# Patient Record
Sex: FEMALE | Race: ASIAN | NOT HISPANIC OR LATINO | Employment: STUDENT | URBAN - METROPOLITAN AREA
[De-identification: names, ages, dates, MRNs, and addresses within clinical notes are randomized per-mention and may not be internally consistent; named-entity substitution may affect disease eponyms.]

---
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
## 2023-05-31 ENCOUNTER — TELEPHONE (OUTPATIENT)
Dept: CARDIOLOGY | Facility: CLINIC | Age: 18
End: 2023-05-31
Payer: MEDICAID

## 2023-05-31 DIAGNOSIS — Q24.2 COR TRIATRIATUM SINISTER: Primary | ICD-10-CM

## 2023-05-31 NOTE — TELEPHONE ENCOUNTER
Appt scheduled for July 26 start time 10 AM. Mom verbalized understanding all information provided. Appointment slips mailed to confirmed address on file.   ----- Message from Juana Morales sent at 5/31/2023  8:42 AM CDT -----  Mom called to schedule patient echo EKG and Holter in two years for [facility]            Mom [phone]            Thank you  Scheduling

## 2023-07-26 ENCOUNTER — OFFICE VISIT (OUTPATIENT)
Dept: CARDIOLOGY | Facility: CLINIC | Age: 18
End: 2023-07-26
Payer: MEDICAID

## 2023-07-26 ENCOUNTER — TELEPHONE (OUTPATIENT)
Dept: CARDIOLOGY | Facility: CLINIC | Age: 18
End: 2023-07-26
Payer: MEDICAID

## 2023-07-26 ENCOUNTER — HOSPITAL ENCOUNTER (OUTPATIENT)
Dept: CARDIOLOGY | Facility: HOSPITAL | Age: 18
Discharge: HOME | End: 2023-07-26
Attending: PEDIATRICS
Payer: MEDICAID

## 2023-07-26 ENCOUNTER — HOSPITAL ENCOUNTER (OUTPATIENT)
Dept: CARDIOLOGY | Facility: CLINIC | Age: 18
Discharge: HOME | End: 2023-07-26
Payer: MEDICAID

## 2023-07-26 VITALS
WEIGHT: 67.88 LBS | OXYGEN SATURATION: 98 % | HEIGHT: 55 IN | HEART RATE: 71 BPM | BODY MASS INDEX: 15.71 KG/M2 | DIASTOLIC BLOOD PRESSURE: 68 MMHG | SYSTOLIC BLOOD PRESSURE: 111 MMHG

## 2023-07-26 VITALS — BODY MASS INDEX: 15.16 KG/M2 | HEIGHT: 55 IN | WEIGHT: 65.5 LBS

## 2023-07-26 DIAGNOSIS — Q24.9 ADULT CONGENITAL HEART DISEASE: ICD-10-CM

## 2023-07-26 DIAGNOSIS — Q24.2 COR TRIATRIATUM SINISTER: ICD-10-CM

## 2023-07-26 DIAGNOSIS — Q24.2 COR TRIATRIATUM SINISTER: Primary | ICD-10-CM

## 2023-07-26 LAB
ASCENDING AORTA: 1.7 CM
AV INDEX (PROSTH): 0.8
AV MEAN GRADIENT: 2 MMHG
AV PEAK GRADIENT: 4 MMHG
AV VALVE AREA: 1.58 CM2
AV VELOCITY RATIO: 0.81
BSA FOR ECHO PROCEDURE: 1.03 M2
CV ECHO LV RWT: 0.44 CM
DOP CALC AO PEAK VEL: 1.02 M/S
DOP CALC AO VTI: 20.49 CM
DOP CALC LVOT AREA: 2 CM2
DOP CALC LVOT DIAMETER: 1.59 CM
DOP CALC LVOT PEAK VEL: 0.83 M/S
DOP CALC LVOT STROKE VOLUME: 32.43 CM3
DOP CALC RVOT PEAK VEL: 0.55 M/S
DOP CALC RVOT VTI: 12.48 CM
DOP CALCLVOT PEAK VEL VTI: 16.34 CM
E WAVE DECELERATION TIME: 135.4 MSEC
E/A RATIO: 1.55
E/E' RATIO: 9.45 M/S
ECHO LV POSTERIOR WALL: 0.73 CM (ref 0.6–1.1)
EJECTION FRACTION: 50 %
FRACTIONAL SHORTENING: 29 % (ref 28–44)
INTERVENTRICULAR SEPTUM: 0.75 CM (ref 0.6–1.1)
LA MAJOR: 3.28 CM
LA MINOR: 3.31 CM
LA WIDTH: 2.55 CM
LEFT ATRIUM SIZE: 2.87 CM
LEFT ATRIUM VOLUME INDEX MOD: 18 ML/M2
LEFT ATRIUM VOLUME INDEX: 19.5 ML/M2
LEFT ATRIUM VOLUME MOD: 18.86 CM3
LEFT ATRIUM VOLUME: 20.5 CM3
LEFT INTERNAL DIMENSION IN SYSTOLE: 2.37 CM (ref 2.1–4)
LEFT VENTRICLE DIASTOLIC VOLUME INDEX: 42.77 ML/M2
LEFT VENTRICLE DIASTOLIC VOLUME: 44.91 ML
LEFT VENTRICLE MASS INDEX: 59 G/M2
LEFT VENTRICLE SYSTOLIC VOLUME INDEX: 18.5 ML/M2
LEFT VENTRICLE SYSTOLIC VOLUME: 19.47 ML
LEFT VENTRICULAR INTERNAL DIMENSION IN DIASTOLE: 3.32 CM (ref 3.5–6)
LEFT VENTRICULAR MASS: 62.17 G
LV LATERAL E/E' RATIO: 9.45 M/S
LV SEPTAL E/E' RATIO: 9.45 M/S
MV PEAK A VEL: 0.67 M/S
MV PEAK E VEL: 1.04 M/S
MV STENOSIS PRESSURE HALF TIME: 39.27 MS
MV VALVE AREA P 1/2 METHOD: 5.6 CM2
PISA TR MAX VEL: 2.13 M/S
PULM VEIN S/D RATIO: 1.02
PV MEAN GRADIENT: 0.64 MMHG
PV PEAK D VEL: 0.47 M/S
PV PEAK S VEL: 0.48 M/S
PV PEAK VELOCITY: 0.9 CM/S
RA MAJOR: 3.24 CM
RA WIDTH: 2.75 CM
RIGHT VENTRICULAR END-DIASTOLIC DIMENSION: 2.62 CM
SINUS: 2.27 CM
STJ: 1.67 CM
TDI LATERAL: 0.11 M/S
TDI SEPTAL: 0.11 M/S
TDI: 0.11 M/S
TR MAX PG: 18 MMHG

## 2023-07-26 PROCEDURE — 3078F PR MOST RECENT DIASTOLIC BLOOD PRESSURE < 80 MM HG: ICD-10-PCS | Mod: CPTII,,, | Performed by: PEDIATRICS

## 2023-07-26 PROCEDURE — 93325 DOPPLER ECHO COLOR FLOW MAPG: CPT | Mod: 26,,, | Performed by: PEDIATRICS

## 2023-07-26 PROCEDURE — 3074F PR MOST RECENT SYSTOLIC BLOOD PRESSURE < 130 MM HG: ICD-10-PCS | Mod: CPTII,,, | Performed by: PEDIATRICS

## 2023-07-26 PROCEDURE — 93303 ECHO TRANSTHORACIC: CPT | Mod: 26,,, | Performed by: PEDIATRICS

## 2023-07-26 PROCEDURE — 93303 ECHO (CUPID ONLY): ICD-10-PCS | Mod: 26,,, | Performed by: PEDIATRICS

## 2023-07-26 PROCEDURE — 93320 DOPPLER ECHO COMPLETE: CPT | Mod: 26,,, | Performed by: PEDIATRICS

## 2023-07-26 PROCEDURE — 1159F PR MEDICATION LIST DOCUMENTED IN MEDICAL RECORD: ICD-10-PCS | Mod: CPTII,,, | Performed by: PEDIATRICS

## 2023-07-26 PROCEDURE — 3074F SYST BP LT 130 MM HG: CPT | Mod: CPTII,,, | Performed by: PEDIATRICS

## 2023-07-26 PROCEDURE — 3008F PR BODY MASS INDEX (BMI) DOCUMENTED: ICD-10-PCS | Mod: CPTII,,, | Performed by: PEDIATRICS

## 2023-07-26 PROCEDURE — 93325 ECHO (CUPID ONLY): ICD-10-PCS | Mod: 26,,, | Performed by: PEDIATRICS

## 2023-07-26 PROCEDURE — 93325 DOPPLER ECHO COLOR FLOW MAPG: CPT

## 2023-07-26 PROCEDURE — 93010 EKG 12-LEAD: ICD-10-PCS | Mod: S$PBB,,, | Performed by: INTERNAL MEDICINE

## 2023-07-26 PROCEDURE — 1159F MED LIST DOCD IN RCRD: CPT | Mod: CPTII,,, | Performed by: PEDIATRICS

## 2023-07-26 PROCEDURE — 99213 OFFICE O/P EST LOW 20 MIN: CPT | Mod: PBBFAC,25 | Performed by: PEDIATRICS

## 2023-07-26 PROCEDURE — 93010 ELECTROCARDIOGRAM REPORT: CPT | Mod: S$PBB,,, | Performed by: INTERNAL MEDICINE

## 2023-07-26 PROCEDURE — 99999 PR PBB SHADOW E&M-EST. PATIENT-LVL III: ICD-10-PCS | Mod: PBBFAC,,, | Performed by: PEDIATRICS

## 2023-07-26 PROCEDURE — 3078F DIAST BP <80 MM HG: CPT | Mod: CPTII,,, | Performed by: PEDIATRICS

## 2023-07-26 PROCEDURE — 93320 ECHO (CUPID ONLY): ICD-10-PCS | Mod: 26,,, | Performed by: PEDIATRICS

## 2023-07-26 PROCEDURE — 3008F BODY MASS INDEX DOCD: CPT | Mod: CPTII,,, | Performed by: PEDIATRICS

## 2023-07-26 PROCEDURE — 99999 PR PBB SHADOW E&M-EST. PATIENT-LVL III: CPT | Mod: PBBFAC,,, | Performed by: PEDIATRICS

## 2023-07-26 PROCEDURE — 99214 OFFICE O/P EST MOD 30 MIN: CPT | Mod: 25,S$PBB,, | Performed by: PEDIATRICS

## 2023-07-26 PROCEDURE — 93005 ELECTROCARDIOGRAM TRACING: CPT | Mod: PBBFAC | Performed by: INTERNAL MEDICINE

## 2023-07-26 PROCEDURE — 99214 PR OFFICE/OUTPT VISIT, EST, LEVL IV, 30-39 MIN: ICD-10-PCS | Mod: 25,S$PBB,, | Performed by: PEDIATRICS

## 2023-07-26 NOTE — PROGRESS NOTES
2023    re:Dixie Rogers  :2005    Nikky Huffman MD  [address]  [place] [place] [place] [address]    [facility]     Dear Dr. Huffman:    Dixie Rogers is a 18 y.o. female seen in my [facility] clinic today for evaluation of congenital heart disease.  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  There is no need for endocarditis prophylaxis or activity restriction.   Follow up in 3 years with echo and ekg.  Can likely space clinic visits out further at that time.    Discussion:  She has a nonobstructive cor triatriatum on the left side.  It is highly unlikely this would progress to require intervention.  She should be treated as completely normal from a cardiac standpoint.  My plan is as noted above.    History of present illness:  This is her 1st time being seen in Adult Cong[facility] Disease Clinic.  She was followed in the past by Dr. Malik.  She is asymptomatic from a cardiovascular standpoint with no concerns about chest pain, palpitations, syncope, near syncope, cyanosis, or edema.  She is very active.  She has significant developmental delay, and the history is provided by the mother.    Past Medical History:   Diagnosis Date    Cleft hard palate     Cor triatriatum sinister     nonobstructive    Otitis media     PDA (patent ductus arteriosus)     Maico Noble's syndrome     Sensorineural hearing loss, unspecified     Strabismus     Vision abnormalities     Vocal cord paralysis      Past Surgical History:   Procedure Laterality Date    cleft palate      DIRECT LARYNGOBRONCHOSCOPY  13    w/ Excision of Tracheocutaneous Fustula Dr. Barrett    epibleharon  2013    Probing and balloon dilation NLD ou    GASTROSTOMY TUBE PLACEMENT      Removed;  finished using it when she was 9-10 yo    MANDIBLE SURGERY  2012    PATENT DUCTUS ARTERIOUS LIGATION  2003    Probing and balloon dilation  07/24/2013    STRABISMUS SURGERY Right     anterior sagittalization of IO  OD    tracheotomy      Trach removed 12/3/2012    TYMPANOSTOMY TUBE PLACEMENT       Family History   Problem Relation Age of Onset    No Known Problems Mother     No Known Problems Father     No Known Problems Sister     Aneurysm Maternal Grandmother     Hypertension Maternal Grandfather     Congenital heart disease Neg Hx     Pacemaker/defibrilator Neg Hx     Early death Neg Hx     Arrhythmia Neg Hx      Social History     Socioeconomic History    Marital status: Single   Tobacco Use    Smoking status: Passive Smoke Exposure - Never Smoker    Smokeless tobacco: Never    Tobacco comments:     Dad smokes   Substance and Sexual Activity    Alcohol use: No    Drug use: No    Sexual activity: Never   Social History Narrative    Lives at home with mom, dad, and sister.No pets at home.  Attends [email or web address].  In special education classes.  Dad smokes.     Current Outpatient Medications on File Prior to Visit   Medication Sig Dispense Refill    cetirizine (ZYRTEC) 1 mg/mL syrup Take 5 mg by mouth daily as needed.      ciprofloxacin-dexamethasone 0.3-0.1% (CIPRODEX) 0.3-0.1 % DrpS 4 gtts to the affected ear(s) bid x 10 d (Patient taking differently: daily as needed. 4 gtts to the affected ear(s) bid x 10 d) 7.5 mL 0     No current facility-administered medications on file prior to visit.     Review of patient's allergies indicates:  No Known Allergies     The review of systems is as noted above. It is otherwise negative for other symptoms related to the general, neurological, psychiatric, endocrine, gastrointestinal, genitourinary, respiratory, dermatologic, musculoskeletal, hematologic, and immunologic systems.    /68 (BP Location: Left arm, Patient Position: Sitting, BP Method: Small  "(Automatic))   Pulse 71   Ht 4' 2.8" (1.29 m)   Wt 30.8 kg (67 lb 14.4 oz)   SpO2 98%   BMI 18.50 kg/m²     Wt Readings from Last 3 Encounters:   07/26/23 30.8 kg (67 lb 14.4 oz)   07/26/23 29.7 kg (65 lb 7.7 oz)   06/15/21 29.7 kg (65 lb 7.6 oz) (<1 %, Z= -7.61)*     * Growth percentiles are based on CDC (Girls, 2-20 Years) data.     Ht Readings from Last 3 Encounters:   07/26/23 4' 2.8" (1.29 m)   07/26/23 4' 2.8" (1.29 m)   06/15/21 4' 2.79" (1.29 m) (<1 %, Z= -5.24)*     * Growth percentiles are based on CDC (Girls, 2-20 Years) data.     Body mass index is 18.5 kg/m².  Facility age limit for growth percentiles is 20 years.  Facility age limit for growth percentiles is 20 years.  Facility age limit for growth percentiles is 20 years.    In general, she is a very small but healthy-appearingdysmorphic female in no apparent distress.  The eyes, nares, and oropharynx are clear.  Eyelids and conjunctiva are normal without drainage or erythema.  Pupils equal and round bilaterally.  The head is normocephalic and atraumatic.  The neck is supple without jugular venous distention or thyroid enlargement.  The lungs are clear to auscultation bilaterally.  There are no scars on the chest wall.  The first and second heart sounds are normal.  There are no murmurs, gallops, rubs, or clicks in the standing position.  The abdominal exam is benign without hepatosplenomegaly, tenderness, or distention, but she does have G-tube.  Pulses are normal in all 4 extremities with brisk capillary refill and no clubbing, cyanosis, or edema.  No rashes are noted.    I personally reviewed the following tests performed today and my interpretation follows:  EKG performed in clinic today is normal.    Results for orders placed during the hospital encounter of 07/26/23    Echo    Interpretation Summary  CONGENITAL CARDIAC HISTORY:  Cor triatriatum sinister-nonobstructive.  H/O Maico Noble, cleft palate, tracheostomy and g-tube.    SEGMENTAL " CARDIAC CONNECTIONS:  Abdominal situs solitus.  Atrial situs solitus.  Atrioventricular alignment is concordant.  D-loop ventricles.  The tricuspid valve appears grossly normal.  The mitral valve appears grossly normal.  There is symmetric development of the left and right ventricles.  The pulmonary valve is structurally normal.  Trivial fusion between coronary cusps of the aortic valve previously reported).  Cardiac position is levocardia.  There is a left aortic arch branching demonstrated.  No interatrial septal defect present.  No ventricular septal defect present.  No patent ductus arteriosis is present.      IMPRESSION:  Difficult acoustic windows-  Qualitative impression of normal right atrial size.  Qualitatively normal right ventricular size and structure with good systolic function.  Normal pulmonary valve structure and function with no stenosis and trivial insufficiency.  Qualitative impression of mildly dilated, confluent pulmonary arteries.  The left atrial volume index is normal in size measuring  20 ml/m2.  Partial septation of the left atrium demonstrated with laminar flow of pulmonary venous return to the mitral annulus by color doppler and peak velocity <1.2 m/sec. by continuous-wave Doppler.  Color Doppler demonstrates trivial to mild mitral insufficiency.  Impression of normal left ventricular size and structure.  Normal septal motion with good movement of the LV free wall, SF = 29% and EF estimated 50 -55% from apical views.  Normal indices of left ventricular diastolic function.  Structure of the aortic valve is not well demonstrated with normal velocity across the valve and no significant insufficiency  (trivial fusion between coronary cusps of the aortic valve previously reported).  Aortic dimensions:  Sinuses of Valsalva = 23 mm.  ST junction               = 16 mm.  Ascending aorta      = 17mm.  The aorta is normal in size with no evidence of coarctation.  No pericardial effusion.      Thank  you for referring this patient to our clinic.  Please call with any questions.    Sincerely,        Que Banda MD  Pediatric Cardiology  Adult Congenital Heart Disease  Pediatric Heart Failure and Transplantation  [facility] [address] [place]  [address]  [phone]

## 2023-07-26 NOTE — TELEPHONE ENCOUNTER
Told mom to proceed to echo first when she arrives   ----- Message from Gita Guerrero sent at 7/26/2023  9:23 AM CDT -----  Contact: [phone] Akanksha  Patient would like to get medical advice.  Symptoms (please be specific):   Akanksha states she has passed Nina and may be late for the 10 am EKG appt at  due to traffic.   How long have you had these symptoms: N/A  Would you like a call back, or a response through your [facility] portal?:   call back if necessary  Pharmacy name and phone # (copy from chart):   N/A  Comments:

## 2024-01-04 ENCOUNTER — OFFICE VISIT (OUTPATIENT)
Dept: OPHTHALMOLOGY | Facility: CLINIC | Age: 19
End: 2024-01-04
Payer: MEDICAID

## 2024-01-04 ENCOUNTER — OFFICE VISIT (OUTPATIENT)
Dept: OTOLARYNGOLOGY | Facility: CLINIC | Age: 19
End: 2024-01-04
Payer: MEDICAID

## 2024-01-04 VITALS — WEIGHT: 67.69 LBS | BODY MASS INDEX: 18.44 KG/M2

## 2024-01-04 DIAGNOSIS — Q10.3 EPIBLEPHARON: Primary | ICD-10-CM

## 2024-01-04 DIAGNOSIS — J38.00 VOCAL CORD PARALYSIS: ICD-10-CM

## 2024-01-04 DIAGNOSIS — Q35.9 CLEFT PALATE: ICD-10-CM

## 2024-01-04 DIAGNOSIS — H71.12 GRANULOMA OF TYMPANIC MEMBRANE OF LEFT EAR: ICD-10-CM

## 2024-01-04 DIAGNOSIS — H52.13 MYOPIA OF BOTH EYES: ICD-10-CM

## 2024-01-04 DIAGNOSIS — H02.403 PTOSIS OF EYELID, BILATERAL: ICD-10-CM

## 2024-01-04 DIAGNOSIS — H65.93 MEE (MIDDLE EAR EFFUSION), BILATERAL: ICD-10-CM

## 2024-01-04 DIAGNOSIS — H73.893 RETRACTION OF TYMPANIC MEMBRANE OF BOTH EARS: ICD-10-CM

## 2024-01-04 DIAGNOSIS — H61.23 BILATERAL IMPACTED CERUMEN: ICD-10-CM

## 2024-01-04 DIAGNOSIS — Q87.0 PIERRE ROBIN SYNDROME: ICD-10-CM

## 2024-01-04 PROCEDURE — 1159F MED LIST DOCD IN RCRD: CPT | Mod: CPTII,,, | Performed by: STUDENT IN AN ORGANIZED HEALTH CARE EDUCATION/TRAINING PROGRAM

## 2024-01-04 PROCEDURE — 92014 COMPRE OPH EXAM EST PT 1/>: CPT | Mod: S$PBB,,, | Performed by: STUDENT IN AN ORGANIZED HEALTH CARE EDUCATION/TRAINING PROGRAM

## 2024-01-04 PROCEDURE — 1160F RVW MEDS BY RX/DR IN RCRD: CPT | Mod: CPTII,,, | Performed by: OTOLARYNGOLOGY

## 2024-01-04 PROCEDURE — 99212 OFFICE O/P EST SF 10 MIN: CPT | Mod: PBBFAC | Performed by: STUDENT IN AN ORGANIZED HEALTH CARE EDUCATION/TRAINING PROGRAM

## 2024-01-04 PROCEDURE — 69210 REMOVE IMPACTED EAR WAX UNI: CPT | Mod: 50,PBBFAC | Performed by: OTOLARYNGOLOGY

## 2024-01-04 PROCEDURE — 99999 PR PBB SHADOW E&M-EST. PATIENT-LVL II: CPT | Mod: PBBFAC,,, | Performed by: STUDENT IN AN ORGANIZED HEALTH CARE EDUCATION/TRAINING PROGRAM

## 2024-01-04 PROCEDURE — 1159F MED LIST DOCD IN RCRD: CPT | Mod: CPTII,,, | Performed by: OTOLARYNGOLOGY

## 2024-01-04 PROCEDURE — 69210 REMOVE IMPACTED EAR WAX UNI: CPT | Mod: S$PBB,,, | Performed by: OTOLARYNGOLOGY

## 2024-01-04 PROCEDURE — 92015 DETERMINE REFRACTIVE STATE: CPT | Mod: ,,, | Performed by: STUDENT IN AN ORGANIZED HEALTH CARE EDUCATION/TRAINING PROGRAM

## 2024-01-04 PROCEDURE — 99212 OFFICE O/P EST SF 10 MIN: CPT | Mod: 25,PBBFAC,27 | Performed by: OTOLARYNGOLOGY

## 2024-01-04 PROCEDURE — 99999 PR PBB SHADOW E&M-EST. PATIENT-LVL II: CPT | Mod: PBBFAC,,, | Performed by: OTOLARYNGOLOGY

## 2024-01-04 PROCEDURE — 99204 OFFICE O/P NEW MOD 45 MIN: CPT | Mod: 25,S$PBB,, | Performed by: OTOLARYNGOLOGY

## 2024-01-04 PROCEDURE — 1160F RVW MEDS BY RX/DR IN RCRD: CPT | Mod: CPTII,,, | Performed by: STUDENT IN AN ORGANIZED HEALTH CARE EDUCATION/TRAINING PROGRAM

## 2024-01-04 PROCEDURE — 3008F BODY MASS INDEX DOCD: CPT | Mod: CPTII,,, | Performed by: OTOLARYNGOLOGY

## 2024-01-04 RX ORDER — NEOMYCIN SULFATE, POLYMYXIN B SULFATE AND HYDROCORTISONE 10; 3.5; 1 MG/ML; MG/ML; [USP'U]/ML
3 SUSPENSION/ DROPS AURICULAR (OTIC) 3 TIMES DAILY
Qty: 10 ML | Refills: 0 | Status: SHIPPED | OUTPATIENT
Start: 2024-01-04

## 2024-01-04 RX ORDER — AMOXICILLIN AND CLAVULANATE POTASSIUM 600; 42.9 MG/5ML; MG/5ML
1200 POWDER, FOR SUSPENSION ORAL 2 TIMES DAILY
Qty: 200 ML | Refills: 0 | Status: SHIPPED | OUTPATIENT
Start: 2024-01-04 | End: 2024-01-14

## 2024-01-04 NOTE — PROGRESS NOTES
Subjective:       Patient ID: Dixie Rogers is a 18 y.o. female.    Chief Complaint: Cerumen Impaction    HPI      Has very complex PMH.See ROS. Known HL AS. Maico Dickey w CP - 3 CP surg.    Has  a known perforation of left ear and COM AD. The abnormality was first noted several  years ago. There is prior history of PE Tubes. There is no prior Hx of ear trauma, of a blow to the ear, of placing a sharp object in the ear and of placing a foreign body in the ear. The size of the perforation is medium (26 - 50%). Associated signs and symptoms include hearing loss and drainage on/off. The patient has not had a prior repair on either ear.          Here for follow up, last seen 7/3/19.    Intermittent otorrhea AU.  Ears getting were wet when bathing. Still happens on/off.     Has HL AU . AS >> AD.  Needs aids. Not done yet.          Review of Systems   Constitutional: Negative.         MS   HENT:  Positive for hearing loss.         Perf AU  MT x 2 AD/x1 AS - out AU   SNHL AS  CP - 3 surg  MS w severe retrognathia and diff airway   Eyes:  Negative for visual disturbance.   Respiratory:  Negative for wheezing and stridor.         Trach - removed   Cardiovascular: Negative.         PDA repair   Gastrointestinal:  Negative for nausea and vomiting.        G tube - out    Genitourinary:  Negative for enuresis.        No UTI's   Musculoskeletal:  Negative for arthralgias and myalgias.   Skin: Negative.    Neurological:  Negative for dizziness, seizures and weakness.        No focal neurological signs   Hematological:  Negative for adenopathy. Does not bruise/bleed easily.        Negative for anemia   Psychiatric/Behavioral:  Negative for behavioral problems. The patient is not hyperactive.        Objective:      Physical Exam  Constitutional:       Appearance: She is ill-appearing (very, very small for age).      Comments: Poor speech; nasal   HENT:      Head: Normocephalic.      Right Ear: No drainage ( ). A middle ear effusion is  present. There is impacted cerumen. A PE tube (removed eac w huge ci) is present. Tympanic membrane is perforated (10% post inf  ; PET out on TM ) and retracted (severe). Tympanic membrane is not erythematous.      Left Ear: External ear normal. No drainage. A middle ear effusion is present. There is impacted cerumen. Tympanic membrane is scarred (thickened TM with granulation), perforated (was 105 , cannot see w granulations), erythematous (granular cannot see perf) and retracted.      Ears:        Nose: Nose normal. No nasal deformity.      Mouth/Throat:      Mouth: No oral lesions (sm chin; good ROM of TMJ).      Dentition: Abnormal dentition. No dental caries ( numerous fillings).      Tonsils: 2+ on the right. 2+ on the left.     Eyes:      Pupils: Pupils are equal, round, and reactive to light.   Neck:      Trachea: Trachea normal.   Cardiovascular:      Rate and Rhythm: Normal rate and regular rhythm.   Pulmonary:      Effort: Pulmonary effort is normal. No respiratory distress.   Musculoskeletal:         General: Normal range of motion.      Cervical back: Normal range of motion.   Skin:     General: Skin is warm.      Findings: No rash.   Neurological:      Mental Status: She is alert.      Cranial Nerves: No cranial nerve deficit.   Psychiatric:         Speech: Speech is delayed.         Behavior: Behavior normal.      Comments: Mild/mod DD           Cerumen removal: Ears cleared under microscopic vision with curette, forceps and suction as necessary. Child appropriately restrained by parent or/and papoose board.   Assessment:       1. Maico Noble syndrome    2. Cleft palate    3. JAKI (middle ear effusion), bilateral    4. Granuloma of tympanic membrane of left ear - r/o cholestatoma    5. 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 Ptosis of eyelid, bilateral    7. Vocal cord paralysis - L    8. Bilateral impacted cerumen        Plan:       1. RTC 3 wks   2 Ears dry   3 Midface as per Janelle/Ortho      4 No VPI surgery rec - airway , midface will be problem      5 COS AS + Aug ES

## 2024-01-04 NOTE — ASSESSMENT & PLAN NOTE
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

## 2024-01-04 NOTE — PROGRESS NOTES
DANYA Rogers is a 18 y.o. female who comes in to establish eye care. Her last   eye exam was with Dr. Cuello on 07/26/2022 . She has a history of   chromosomal deletion and developmental delay and an ocular history of of   myopia (wears glasses) and NLDO (s/p probing 2013) and epiblepharon (s/p   repair 2013).  Today, she is here for annual ocular health check. Mom has no concerns   about her vision or the appearance of her eyes and eyelids today.    History obtained by parent/guardian accompanying patient at today's   appointment           Last edited by Herve Wills MD on 1/4/2024  3:08 PM.        ROS    Negative for: Constitutional, Gastrointestinal, Neurological, Skin,   Genitourinary, Musculoskeletal, HENT, Endocrine, Cardiovascular, Eyes,   Respiratory, Psychiatric, Allergic/Imm, Heme/Lymph  Last edited by Herve Wills MD on 1/4/2024  3:08 PM.        Assessment /Plan     For exam results, see Encounter Report.    Epiblepharon - Both Eyes    Myopia of both eyes    Ptosis of eyelid, bilateral          Problem List Items Addressed This Visit          Ophtho    Epiblepharon - Both Eyes - Primary    Current Assessment & Plan     S/p repair 2013 with Dr. 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

## 2024-01-04 NOTE — ASSESSMENT & PLAN NOTE
Slightly irregular streak OU - likely due to concomitant ptosis and high astigmatism (OS>OD)    Still with good uncorrected VA    Will give glasses - encourage full time wear

## 2024-01-04 NOTE — ASSESSMENT & PLAN NOTE
S/p repair 2013 with Dr. Floyd    1/4/23: OD lower lid in good position ; OS with some residual epiblepharon, but minimal trichiasis and healthy cornea OS    Plan:  Observe

## 2024-01-23 ENCOUNTER — OFFICE VISIT (OUTPATIENT)
Dept: OTOLARYNGOLOGY | Facility: CLINIC | Age: 19
End: 2024-01-23
Payer: MEDICAID

## 2024-01-23 VITALS — WEIGHT: 67.69 LBS | BODY MASS INDEX: 18.44 KG/M2

## 2024-01-23 DIAGNOSIS — J38.00 VOCAL CORD PARALYSIS: ICD-10-CM

## 2024-01-23 DIAGNOSIS — H72.92 PERFORATED TYMPANIC MEMBRANE, LEFT: ICD-10-CM

## 2024-01-23 DIAGNOSIS — H61.23 BILATERAL IMPACTED CERUMEN: ICD-10-CM

## 2024-01-23 DIAGNOSIS — H73.893 RETRACTION OF TYMPANIC MEMBRANE OF BOTH EARS: ICD-10-CM

## 2024-01-23 DIAGNOSIS — Q87.0 PIERRE ROBIN SYNDROME: Primary | ICD-10-CM

## 2024-01-23 DIAGNOSIS — H71.12 GRANULOMA OF TYMPANIC MEMBRANE OF LEFT EAR: ICD-10-CM

## 2024-01-23 DIAGNOSIS — Q35.9 CLEFT PALATE: ICD-10-CM

## 2024-01-23 PROCEDURE — 99999 PR PBB SHADOW E&M-EST. PATIENT-LVL II: CPT | Mod: PBBFAC,,, | Performed by: OTOLARYNGOLOGY

## 2024-01-23 PROCEDURE — 1159F MED LIST DOCD IN RCRD: CPT | Mod: CPTII,,, | Performed by: OTOLARYNGOLOGY

## 2024-01-23 PROCEDURE — 3008F BODY MASS INDEX DOCD: CPT | Mod: CPTII,,, | Performed by: OTOLARYNGOLOGY

## 2024-01-23 PROCEDURE — 69210 REMOVE IMPACTED EAR WAX UNI: CPT | Mod: S$PBB,,, | Performed by: OTOLARYNGOLOGY

## 2024-01-23 PROCEDURE — 69210 REMOVE IMPACTED EAR WAX UNI: CPT | Mod: 50,PBBFAC | Performed by: OTOLARYNGOLOGY

## 2024-01-23 PROCEDURE — 99214 OFFICE O/P EST MOD 30 MIN: CPT | Mod: 25,S$PBB,, | Performed by: OTOLARYNGOLOGY

## 2024-01-23 PROCEDURE — 99212 OFFICE O/P EST SF 10 MIN: CPT | Mod: PBBFAC | Performed by: OTOLARYNGOLOGY

## 2024-01-23 NOTE — PROGRESS NOTES
Subjective:       Patient ID: Dixie Rogers is a 18 y.o. female.    Chief Complaint: JAKI AU 3 week f/u    HPI      Has very complex PMH.See ROS. Known HL AS. Maico Dickey w CP - 3 CP surg.    Has  a known perforation of left ear and COM AD. The abnormality was first noted several  years ago. There is prior history of PE Tubes. There is no prior Hx of ear trauma, of a blow to the ear, of placing a sharp object in the ear and of placing a foreign body in the ear. The size of the perforation is medium (26 - 50%). Associated signs and symptoms include hearing loss and drainage on/off. The patient has not had a prior repair on either ear.          Here for follow up, last seen 1/4/24 w dc AS + granuloma AS ( r/o cholest) + perf AD w PET out on TM. Rx w Aug ES + COS AS.     Seems well.     Intermittent otorrhea AU.  Ears getting were wet when bathing. Still happens on/off.     Has HL AU . AS >> AD.  Needs aids. Not done yet.          Review of Systems   Constitutional: Negative.         NM   HENT:  Positive for hearing loss.         Perf AU  MT x 2 AD/x1 AS - out AU   SNHL AS  CP - 3 surg  NM w severe retrognathia and diff airway   Eyes:  Negative for visual disturbance.   Respiratory:  Negative for wheezing and stridor.         Trach - removed   Cardiovascular: Negative.         PDA repair   Gastrointestinal:  Negative for nausea and vomiting.        G tube - out    Genitourinary:  Negative for enuresis.        No UTI's   Musculoskeletal:  Negative for arthralgias and myalgias.   Skin: Negative.    Neurological:  Negative for dizziness, seizures and weakness.        No focal neurological signs   Hematological:  Negative for adenopathy. Does not bruise/bleed easily.        Negative for anemia   Psychiatric/Behavioral:  Negative for behavioral problems. The patient is not hyperactive.        Objective:      Physical Exam  Constitutional:       Appearance: She is ill-appearing (very, very small for age).      Comments: Poor  speech; nasal   HENT:      Head: Normocephalic.      Right Ear: No drainage ( ). No middle ear effusion. There is impacted cerumen. No PE tube. Tympanic membrane is perforated (closed) and retracted (severe). Tympanic membrane is not erythematous.      Left Ear: External ear normal. No drainage.  No middle ear effusion. There is impacted cerumen. Tympanic membrane is scarred and perforated (1-% ant inf). Tympanic membrane is not erythematous or retracted.      Ears:        Nose: Nose normal. No nasal deformity.      Mouth/Throat:      Mouth: No oral lesions (sm chin; good ROM of TMJ).      Dentition: Abnormal dentition. No dental caries ( numerous fillings).      Tonsils: 2+ on the right. 2+ on the left.     Eyes:      Pupils: Pupils are equal, round, and reactive to light.   Neck:      Trachea: Trachea normal.   Cardiovascular:      Rate and Rhythm: Normal rate and regular rhythm.   Pulmonary:      Effort: Pulmonary effort is normal. No respiratory distress.   Musculoskeletal:         General: Normal range of motion.      Cervical back: Normal range of motion.   Skin:     General: Skin is warm.      Findings: No rash.   Neurological:      Mental Status: She is alert.      Cranial Nerves: No cranial nerve deficit.   Psychiatric:         Speech: Speech is delayed.         Behavior: Behavior normal.      Comments: Mild/mod DD           Cerumen removal: Ears cleared under microscopic vision with curette, forceps and suction as necessary. Child appropriately restrained by parent or/and papoose board.   Assessment:       1. Maico Noble syndrome    2. Cleft palate    3. Perforated tympanic membrane, left    4. Granuloma of tympanic membrane of left ear - resolved w nocholestatoma    5. Retraction of tympanic membrane of both ears - severe R; resolved AS    6. Bilateral impacted cerumen    7. Vocal cord paralysis - L        Plan:       1. RTC 6 wks   2 Ears dry   3 Midface as per Janelle/Ortho     4 No VPI surgery rec -  airway , midface will be problem      5. Follow re MT AD or BMT

## 2024-03-13 ENCOUNTER — OFFICE VISIT (OUTPATIENT)
Dept: OTOLARYNGOLOGY | Facility: CLINIC | Age: 19
End: 2024-03-13
Payer: MEDICAID

## 2024-03-13 VITALS — BODY MASS INDEX: 18.44 KG/M2 | WEIGHT: 67.69 LBS

## 2024-03-13 DIAGNOSIS — H72.92 PERFORATED TYMPANIC MEMBRANE, LEFT: ICD-10-CM

## 2024-03-13 DIAGNOSIS — J38.00 VOCAL CORD PARALYSIS: ICD-10-CM

## 2024-03-13 DIAGNOSIS — Q35.9 CLEFT PALATE: ICD-10-CM

## 2024-03-13 DIAGNOSIS — H73.893 RETRACTION OF TYMPANIC MEMBRANE OF BOTH EARS: ICD-10-CM

## 2024-03-13 DIAGNOSIS — H61.23 BILATERAL IMPACTED CERUMEN: ICD-10-CM

## 2024-03-13 DIAGNOSIS — H02.403 PTOSIS OF EYELID, BILATERAL: ICD-10-CM

## 2024-03-13 DIAGNOSIS — Q87.0 PIERRE ROBIN SYNDROME: Primary | ICD-10-CM

## 2024-03-13 PROCEDURE — 69210 REMOVE IMPACTED EAR WAX UNI: CPT | Mod: PBBFAC | Performed by: OTOLARYNGOLOGY

## 2024-03-13 PROCEDURE — 99213 OFFICE O/P EST LOW 20 MIN: CPT | Mod: PBBFAC | Performed by: OTOLARYNGOLOGY

## 2024-03-13 PROCEDURE — 1159F MED LIST DOCD IN RCRD: CPT | Mod: CPTII,,, | Performed by: OTOLARYNGOLOGY

## 2024-03-13 PROCEDURE — 3008F BODY MASS INDEX DOCD: CPT | Mod: CPTII,,, | Performed by: OTOLARYNGOLOGY

## 2024-03-13 PROCEDURE — 69210 REMOVE IMPACTED EAR WAX UNI: CPT | Mod: S$PBB,,, | Performed by: OTOLARYNGOLOGY

## 2024-03-13 PROCEDURE — 99214 OFFICE O/P EST MOD 30 MIN: CPT | Mod: 25,S$PBB,, | Performed by: OTOLARYNGOLOGY

## 2024-03-13 PROCEDURE — 99999 PR PBB SHADOW E&M-EST. PATIENT-LVL III: CPT | Mod: PBBFAC,,, | Performed by: OTOLARYNGOLOGY

## 2024-03-13 NOTE — PROGRESS NOTES
Subjective:       Patient ID: Dixie Rogers is a 18 y.o. female.    Chief Complaint: Retraction of tympanic membrane AU and Maico Dickey    HPI      Has very complex PMH.See ROS. Known HL AS. Maico Dickey w CP - 3 CP surg.    Has  a known perforation of left ear and COM w retraction  AD. The abnormality was first noted several  years ago. There is prior history of PE Tubes. There is no prior Hx of ear trauma, of a blow to the ear, of placing a sharp object in the ear and of placing a foreign body in the ear. The size of the perforation is medium (26 - 50%). Associated signs and symptoms include hearing loss and drainage on/off. The patient has not had a prior repair on either ear.          Here for follow up, last seen 1/4/23 w severe retraction AD. Following re MY AD .     Seems well.     Has HL AU . AS >> AD.  Needs aids. Not done yet.          Review of Systems   Constitutional: Negative.         MS   HENT:  Positive for hearing loss.         Perf AU  MT x 2 AD/x1 AS - out AU   SNHL AS  CP - 3 surg  MS w severe retrognathia and diff airway   Eyes:  Negative for visual disturbance.   Respiratory:  Negative for wheezing and stridor.         Trach - removed   Cardiovascular: Negative.         PDA repair   Gastrointestinal:  Negative for nausea and vomiting.        G tube - out    Genitourinary:  Negative for enuresis.        No UTI's   Musculoskeletal:  Negative for arthralgias and myalgias.   Skin: Negative.    Neurological:  Negative for dizziness, seizures and weakness.        No focal neurological signs   Hematological:  Negative for adenopathy. Does not bruise/bleed easily.        Negative for anemia   Psychiatric/Behavioral:  Negative for behavioral problems. The patient is not hyperactive.        Objective:      Physical Exam  Constitutional:       Appearance: She is ill-appearing (very, very small for age).      Comments: Poor speech; nasal   HENT:      Head: Normocephalic.      Right Ear: No drainage ( ). No  middle ear effusion. There is impacted cerumen. No PE tube. Tympanic membrane is perforated (closed) and retracted (severe). Tympanic membrane is not erythematous.      Left Ear: External ear normal. No drainage.  No middle ear effusion. There is impacted cerumen. Tympanic membrane is scarred and perforated (1-% ant inf). Tympanic membrane is not erythematous or retracted.      Ears:        Nose: Nose normal. No nasal deformity.      Mouth/Throat:      Mouth: No oral lesions (sm chin; good ROM of TMJ).      Dentition: Abnormal dentition. No dental caries ( numerous fillings).      Tonsils: 2+ on the right. 2+ on the left.     Eyes:      Pupils: Pupils are equal, round, and reactive to light.   Neck:      Trachea: Trachea normal.   Cardiovascular:      Rate and Rhythm: Normal rate and regular rhythm.   Pulmonary:      Effort: Pulmonary effort is normal. No respiratory distress.   Musculoskeletal:         General: Normal range of motion.      Cervical back: Normal range of motion.   Skin:     General: Skin is warm.      Findings: No rash.   Neurological:      Mental Status: She is alert.      Cranial Nerves: No cranial nerve deficit.   Psychiatric:         Speech: Speech is delayed.         Behavior: Behavior normal.      Comments: Mild/mod DD           Cerumen removal: Ears cleared under microscopic vision with curette, forceps and suction as necessary. Child appropriately restrained by parent or/and papoose board.   Assessment:       1. Maico Noble syndrome    2. Cleft palate    3. Perforated tympanic membrane, left    4. Retraction of tympanic membrane of both ears - severe R; resolved AS    5. Bilateral impacted cerumen    6. Vocal cord paralysis - L    7. Ptosis of eyelid, bilateral        Plan:       1. T tube AD + EUA AS  w N2O2 induction

## 2024-04-15 ENCOUNTER — PATIENT MESSAGE (OUTPATIENT)
Dept: OTOLARYNGOLOGY | Facility: CLINIC | Age: 19
End: 2024-04-15
Payer: MEDICAID

## 2024-04-15 NOTE — PRE-PROCEDURE INSTRUCTIONS
Ped. Pre-Op Instructions given:    -- Medication information (what to hold and what to take)   -- Pediatric NPO instructions as follows: (or as per your Surgeon)  1. Stop ALL solid food, gum, candy (including formula/breast milk with cereal in it) 8 hours before surgery/procedure time.  2. Stop all CLOUDY liquids: formula, tube feeds, cloudy juices and thicken liquids 6 hours prior to surgery/procedure time.  3. Stop plain breast milk 4 hours prior to surgery/procedure time.  4. The patient should be ENCOURAGED to drink carbohydrate-rich clear liquids (sports drinks), clear juices and water until 2 hours prior to surgery/procedure  time.  5. CLEAR liquids include only water, clear oral rehydration drinks, clear sports drinks or clear fruit juices (no orange juice, no pulpy juices, no apple cider).    6. IF IN DOUBT, drink water instead.   7. NOTHING TO EAT OR DRINK 2 hours before to surgery/procedure time. If you are told to take medication on the morning of surgery, it may be taken with a sip of water.   -- *Arrival place and directions given *.  Time to be given the day before procedure or Friday before (if Monday case) by the Surgeon's Office   -- Bathe with normal soap (or per surgeon's office) and wash hair with normal shampoo  -- Don't wear any jewelry or valuables and no metals on skin or in hair AM of surgery   -- No powder, lotions, creams (except diaper rash)      Pt's mom verbalized understanding.       >>Mom denies fever or URI s/s for past 2 weeks<<      *If going to , see below:     Directions and Instructions for [facility]   At [facility], we have an outstanding team of physicians, anesthesiologists, CRNAs, Registered Nurses, Surgical Technologists, and other ancillary team members all focused on your surgical and procedural care.   Before Your Procedure:   The physician's office will call you with a specific arrival time and directions a day or two before  your scheduled procedure. You may also receive these instructions through your [facility] portal.   Day of Procedure:   Please be sure to arrive at the arrival time given or you may risk your surgery being delayed or canceled. The arrival time is earlier than your scheduled surgery or procedure time. In the winter months please dress warm and bring blankets for you or your child as the waiting room may be cold. If you have difficulty locating the facility, please give us a call at [phone].   Directions:   The [facility] is located on the 1st floor of the hospital building near the [address] entrance.   Parking:   You will park in the [facility] (note location on map). [facility] opens at 5:00 a.m. and has a drop off area by the entrance.  parking is available starting at 7:00 a.m. Please see below for further  parking instructions.   Directions from the parking garage elevators   Blue [facility] Elevators: From the parking garage, take the blue [facility] elevators (located in the center of the parking garage) to the 1st floor of the garage. You will then take a right once off the elevators then another right to the outside of the parking garage. You will be across from the [facility]. You will walk down the sidewalk, pass the  curve at the [address] entrance and continue to follow the sidewalk. You will pass the radiation oncology entrance on your right. Continue to follow the sidewalk to the [facility] glass door entrance.   Hospital Entrance (Inside Route): If a mostly inside route is preferred: Take the inside elevator bank (located at the far north end of the garage) from the parking garage to the 1st floor. On the 1st floor walk past [employer]. Keep walking down the center of the hallway towards the hospital elevators. Once you reach the red brick kamila, take a left and go past the hospital elevators. Take another left  and follow the blue and white [facility] signs around the hallway to the end. Go outside of the door. You will see the [facility] entrance to your right.   Drop Off:   There is a drop off area at the doors of the [facility] for your convenience. If utilized for pediatric patients, an adult must accompany the patient into the surgery center while another adult duncan the vehicle.    (at 7:00 a.m.):   Upon check-in, please let the  know that you are utilizing [email or web address] parking which is free. The . will then call [email or web address] for your car to be picked up. Your keys and phone number will be collected and given to [email or web address] services. You will then be given a ticket. Upon discharge, [email or web address] will be notified to bring your vehicle back when you are ready.   2/6/2024      If going to 2nd floor surgery center, see below:    Directions to the 2nd floor ([facility] Center  The hallway to get to the surgery center is on the 2nd fl between the gold elevators in the atrium.  Follow the hallway into the waiting room (has a fish tank) and check in at desk.

## 2024-04-17 ENCOUNTER — ANESTHESIA EVENT (OUTPATIENT)
Dept: SURGERY | Facility: HOSPITAL | Age: 19
End: 2024-04-17
Payer: MEDICAID

## 2024-04-17 ENCOUNTER — HOSPITAL ENCOUNTER (OUTPATIENT)
Facility: HOSPITAL | Age: 19
Discharge: HOME | End: 2024-04-17
Attending: OTOLARYNGOLOGY | Admitting: OTOLARYNGOLOGY
Payer: MEDICAID

## 2024-04-17 ENCOUNTER — ANESTHESIA (OUTPATIENT)
Dept: SURGERY | Facility: HOSPITAL | Age: 19
End: 2024-04-17
Payer: MEDICAID

## 2024-04-17 VITALS
BODY MASS INDEX: 18.38 KG/M2 | OXYGEN SATURATION: 100 % | SYSTOLIC BLOOD PRESSURE: 122 MMHG | HEART RATE: 77 BPM | RESPIRATION RATE: 18 BRPM | WEIGHT: 67.44 LBS | TEMPERATURE: 98 F | DIASTOLIC BLOOD PRESSURE: 85 MMHG

## 2024-04-17 DIAGNOSIS — H66.90 OTITIS MEDIA: ICD-10-CM

## 2024-04-17 DIAGNOSIS — H69.90 DYSFUNCTION OF EUSTACHIAN TUBE, UNSPECIFIED LATERALITY: Primary | ICD-10-CM

## 2024-04-17 LAB
B-HCG UR QL: NEGATIVE
CTP QC/QA: YES

## 2024-04-17 PROCEDURE — 37000008 HC ANESTHESIA 1ST 15 MINUTES: Performed by: OTOLARYNGOLOGY

## 2024-04-17 PROCEDURE — 71000015 HC POSTOP RECOV 1ST HR: Performed by: OTOLARYNGOLOGY

## 2024-04-17 PROCEDURE — 69436 CREATE EARDRUM OPENING: CPT | Mod: RT,,, | Performed by: OTOLARYNGOLOGY

## 2024-04-17 PROCEDURE — 27800903 OPTIME MED/SURG SUP & DEVICES OTHER IMPLANTS: Performed by: OTOLARYNGOLOGY

## 2024-04-17 PROCEDURE — 81025 URINE PREGNANCY TEST: CPT | Performed by: ANESTHESIOLOGY

## 2024-04-17 PROCEDURE — 25000003 PHARM REV CODE 250: Performed by: OTOLARYNGOLOGY

## 2024-04-17 PROCEDURE — 37000009 HC ANESTHESIA EA ADD 15 MINS: Performed by: OTOLARYNGOLOGY

## 2024-04-17 PROCEDURE — 36000705 HC OR TIME LEV I EA ADD 15 MIN: Performed by: OTOLARYNGOLOGY

## 2024-04-17 PROCEDURE — D9220A PRA ANESTHESIA: Mod: ANES,,, | Performed by: ANESTHESIOLOGY

## 2024-04-17 PROCEDURE — 92502 EAR AND THROAT EXAMINATION: CPT | Mod: 59,,, | Performed by: OTOLARYNGOLOGY

## 2024-04-17 PROCEDURE — 36000704 HC OR TIME LEV I 1ST 15 MIN: Performed by: OTOLARYNGOLOGY

## 2024-04-17 PROCEDURE — 63600175 PHARM REV CODE 636 W HCPCS: Performed by: NURSE ANESTHETIST, CERTIFIED REGISTERED

## 2024-04-17 PROCEDURE — D9220A PRA ANESTHESIA: Mod: CRNA,,, | Performed by: NURSE ANESTHETIST, CERTIFIED REGISTERED

## 2024-04-17 PROCEDURE — 71000044 HC DOSC ROUTINE RECOVERY FIRST HOUR: Performed by: OTOLARYNGOLOGY

## 2024-04-17 PROCEDURE — 25000003 PHARM REV CODE 250: Performed by: NURSE ANESTHETIST, CERTIFIED REGISTERED

## 2024-04-17 DEVICE — TUBE VENT MOD T TB 6X7.6X1.27: Type: IMPLANTABLE DEVICE | Site: EAR | Status: FUNCTIONAL

## 2024-04-17 RX ORDER — CIPROFLOXACIN AND DEXAMETHASONE 3; 1 MG/ML; MG/ML
3 SUSPENSION/ DROPS AURICULAR (OTIC) 2 TIMES DAILY
Start: 2024-04-17 | End: 2024-04-24

## 2024-04-17 RX ORDER — ACETAMINOPHEN 160 MG/5ML
15 SOLUTION ORAL EVERY 4 HOURS PRN
Status: DISCONTINUED | OUTPATIENT
Start: 2024-04-17 | End: 2024-04-17 | Stop reason: HOSPADM

## 2024-04-17 RX ORDER — MIDAZOLAM HYDROCHLORIDE 1 MG/ML
INJECTION INTRAMUSCULAR; INTRAVENOUS
Status: DISCONTINUED | OUTPATIENT
Start: 2024-04-17 | End: 2024-04-17

## 2024-04-17 RX ORDER — SODIUM CHLORIDE 9 MG/ML
INJECTION, SOLUTION INTRAVENOUS CONTINUOUS
Status: DISCONTINUED | OUTPATIENT
Start: 2024-04-17 | End: 2024-04-17 | Stop reason: HOSPADM

## 2024-04-17 RX ORDER — CIPROFLOXACIN AND DEXAMETHASONE 3; 1 MG/ML; MG/ML
SUSPENSION/ DROPS AURICULAR (OTIC)
Status: DISCONTINUED | OUTPATIENT
Start: 2024-04-17 | End: 2024-04-17 | Stop reason: HOSPADM

## 2024-04-17 RX ORDER — PROPOFOL 10 MG/ML
VIAL (ML) INTRAVENOUS
Status: DISCONTINUED | OUTPATIENT
Start: 2024-04-17 | End: 2024-04-17

## 2024-04-17 RX ORDER — DEXAMETHASONE SODIUM PHOSPHATE 4 MG/ML
INJECTION, SOLUTION INTRA-ARTICULAR; INTRALESIONAL; INTRAMUSCULAR; INTRAVENOUS; SOFT TISSUE
Status: DISCONTINUED | OUTPATIENT
Start: 2024-04-17 | End: 2024-04-17

## 2024-04-17 RX ORDER — LIDOCAINE HYDROCHLORIDE 20 MG/ML
INJECTION INTRAVENOUS
Status: DISCONTINUED | OUTPATIENT
Start: 2024-04-17 | End: 2024-04-17

## 2024-04-17 RX ORDER — ACETAMINOPHEN 10 MG/ML
INJECTION, SOLUTION INTRAVENOUS
Status: DISCONTINUED | OUTPATIENT
Start: 2024-04-17 | End: 2024-04-17

## 2024-04-17 RX ORDER — LIDOCAINE HYDROCHLORIDE 10 MG/ML
1 INJECTION, SOLUTION EPIDURAL; INFILTRATION; INTRACAUDAL; PERINEURAL ONCE AS NEEDED
Status: DISCONTINUED | OUTPATIENT
Start: 2024-04-17 | End: 2024-04-17 | Stop reason: HOSPADM

## 2024-04-17 RX ORDER — CIPROFLOXACIN AND DEXAMETHASONE 3; 1 MG/ML; MG/ML
SUSPENSION/ DROPS AURICULAR (OTIC)
Status: DISCONTINUED
Start: 2024-04-17 | End: 2024-04-17 | Stop reason: HOSPADM

## 2024-04-17 RX ORDER — ONDANSETRON HYDROCHLORIDE 2 MG/ML
INJECTION, SOLUTION INTRAVENOUS
Status: DISCONTINUED | OUTPATIENT
Start: 2024-04-17 | End: 2024-04-17

## 2024-04-17 RX ADMIN — MIDAZOLAM HYDROCHLORIDE 2 MG: 2 INJECTION, SOLUTION INTRAMUSCULAR; INTRAVENOUS at 08:04

## 2024-04-17 RX ADMIN — LIDOCAINE HYDROCHLORIDE 40 MG: 20 INJECTION INTRAVENOUS at 08:04

## 2024-04-17 RX ADMIN — PROPOFOL 30 MG: 10 INJECTION, EMULSION INTRAVENOUS at 08:04

## 2024-04-17 RX ADMIN — SODIUM CHLORIDE: 0.9 INJECTION, SOLUTION INTRAVENOUS at 08:04

## 2024-04-17 RX ADMIN — ONDANSETRON 4 MG: 2 INJECTION INTRAMUSCULAR; INTRAVENOUS at 08:04

## 2024-04-17 RX ADMIN — ACETAMINOPHEN 300 MG: 10 INJECTION, SOLUTION INTRAVENOUS at 08:04

## 2024-04-17 RX ADMIN — DEXAMETHASONE SODIUM PHOSPHATE 4 MG: 4 INJECTION, SOLUTION INTRAMUSCULAR; INTRAVENOUS at 08:04

## 2024-04-17 NOTE — H&P
Subjective  Patient ID: Dixie Rogers is a 18 y.o. female.     Chief Complaint: Retraction of tympanic membrane AU and Maico Dickey     HPI       Has very complex PMH.See ROS. Known HL AS. Maico Dickey w CP - 3 CP surg.     Has  a known perforation of left ear and COM w retraction  AD. The abnormality was first noted several  years ago. There is prior history of PE Tubes. There is no prior Hx of ear trauma, of a blow to the ear, of placing a sharp object in the ear and of placing a foreign body in the ear. The size of the perforation is medium (26 - 50%). Associated signs and symptoms include hearing loss and drainage on/off. The patient has not had a prior repair on either ear.          Here for follow up, last seen 1/4/23 w severe retraction AD. Following re MY AD .      Seems well.      Has HL AU . AS >> AD.  Needs aids. Not done yet.            Review of Systems   Constitutional: Negative.         HI   HENT:  Positive for hearing loss.         Perf AU  MT x 2 AD/x1 AS - out AU   SNHL AS  CP - 3 surg  HI w severe retrognathia and diff airway   Eyes:  Negative for visual disturbance.   Respiratory:  Negative for wheezing and stridor.         Trach - removed   Cardiovascular: Negative.         PDA repair   Gastrointestinal:  Negative for nausea and vomiting.        G tube - out    Genitourinary:  Negative for enuresis.        No UTI's   Musculoskeletal:  Negative for arthralgias and myalgias.   Skin: Negative.    Neurological:  Negative for dizziness, seizures and weakness.        No focal neurological signs   Hematological:  Negative for adenopathy. Does not bruise/bleed easily.        Negative for anemia   Psychiatric/Behavioral:  Negative for behavioral problems. The patient is not hyperactive.          Objective:      Objective  Physical Exam  Constitutional:       Appearance: She is ill-appearing (very, very small for age).      Comments: Poor speech; nasal   HENT:      Head: Normocephalic.      Right Ear: No  drainage ( ). No middle ear effusion. There is impacted cerumen. No PE tube. Tympanic membrane is perforated (closed) and retracted (severe). Tympanic membrane is not erythematous.      Left Ear: External ear normal. No drainage.  No middle ear effusion. There is impacted cerumen. Tympanic membrane is scarred and perforated (1-% ant inf). Tympanic membrane is not erythematous or retracted.      Ears:        Nose: Nose normal. No nasal deformity.      Mouth/Throat:      Mouth: No oral lesions (sm chin; good ROM of TMJ).      Dentition: Abnormal dentition. No dental caries ( numerous fillings).      Tonsils: 2+ on the right. 2+ on the left.     Eyes:      Pupils: Pupils are equal, round, and reactive to light.   Neck:      Trachea: Trachea normal.   Cardiovascular:      Rate and Rhythm: Normal rate and regular rhythm.   Pulmonary:      Effort: Pulmonary effort is normal. No respiratory distress.   Musculoskeletal:         General: Normal range of motion.      Cervical back: Normal range of motion.   Skin:     General: Skin is warm.      Findings: No rash.   Neurological:      Mental Status: She is alert.      Cranial Nerves: No cranial nerve deficit.   Psychiatric:         Speech: Speech is delayed.         Behavior: Behavior normal.      Comments: Mild/mod DD               Cerumen removal: Ears cleared under microscopic vision with curette, forceps and suction as necessary. Child appropriately restrained by parent or/and papoose board.   Assessment:      Assessment  1. Maico Noble syndrome    2. Cleft palate    3. Perforated tympanic membrane, left    4. Retraction of tympanic membrane of both ears - severe R; resolved AS    5. Bilateral impacted cerumen    6. Vocal cord paralysis - L    7. Ptosis of eyelid, bilateral          Plan:      Plan   1. T tube AD + EUA AS  w N2O2 induction      4/17/24: No interval change to HPI, patient caregiver amenable to proceed today as scheduled

## 2024-04-17 NOTE — TRANSFER OF CARE
Anesthesia Transfer of Care Note    Patient: Dixie Rogers    Procedure(s) Performed: Procedure(s) (LRB):  MYRINGOTOMY, WITH TYMPANOSTOMY TUBE INSERTION (Right)    Patient location: PACU    Anesthesia Type: general    Transport from OR: Transported from OR on 100% O2 by closed face mask with adequate spontaneous ventilation    Post pain: adequate analgesia    Post assessment: no apparent anesthetic complications    Post vital signs: stable    Level of consciousness: awake    Nausea/Vomiting: no nausea/vomiting    Complications: none    Transfer of care protocol was followed      Last vitals: Visit Vitals  /85 (BP Location: Left arm, Patient Position: Lying)   Pulse 77   Temp 36.3 °C (97.3 °F) (Temporal)   Resp 18   Wt 30.6 kg (67 lb 7.4 oz)   LMP 04/10/2024 (Approximate)   SpO2 100%   Breastfeeding No   BMI 18.38 kg/m²

## 2024-04-17 NOTE — DISCHARGE SUMMARY
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

## 2024-04-17 NOTE — ANESTHESIA PREPROCEDURE EVALUATION
04/17/2024  Dixie Rogers is a 19 y.o., female.  Pre-operative evaluation for Procedure(s) (LRB):  MYRINGOTOMY, WITH TYMPANOSTOMY TUBE INSERTION (Right)    Dixie Rogers is a 19 y.o. 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     Current Outpatient Medications on File Prior to Encounter   Medication Sig Dispense Refill    cetirizine (ZYRTEC) 1 mg/mL syrup Take 5 mg by mouth daily as needed.      neomycin-polymyxin-hydrocortisone (CORTISPORIN) 3.5-10,000-1 mg/mL-unit/mL-% otic suspension Place 3 drops into the left ear 3 (three) times daily. (Patient not taking: Reported on 4/15/2024) 10 mL 0       Past Surgical History:   Procedure Laterality Date    cleft palate      DIRECT LARYNGOBRONCHOSCOPY  07/24/13    w/ Excision of Tracheocutaneous Fustula Dr. Barrett    [email or web address]  07/24/2013    Probing and balloon dilation NLD ou    GASTROSTOMY TUBE PLACEMENT      Removed; finished using it when she was 9-10 yo    MANDIBLE SURGERY  2012    PATENT DUCTUS ARTERIOUS LIGATION  2003    Probing and balloon dilation  07/24/2013    STRABISMUS SURGERY Right     anterior sagittalization of IO  OD    tracheotomy      Trach removed 12/3/2012    TYMPANOSTOMY TUBE PLACEMENT         Social History     Socioeconomic History    Marital status: Single    Tobacco Use    Smoking status: Never     Passive exposure: Yes    Smokeless tobacco: Never    Tobacco comments:     Dad smokes   Substance and Sexual Activity    Alcohol use: No    Drug use: No    Sexual activity: Never   Social History Narrative    Lives at home with mom, dad, and sister.No pets at home.  Attends [employer].  In special education classes.  Dad smokes.     Social Determinants of Health     Financial Resource Strain: Low Risk  (1/4/2024)    Overall Financial Resource Strain (CARDIA)     Difficulty of Paying Living Expenses: Not very hard   Food Insecurity: No Food Insecurity (1/4/2024)    Hunger Vital Sign     Worried About Running Out of Food in the Last Year: Never true     Ran Out of Food in the Last Year: Never true   Transportation Needs: No Transportation Needs (1/4/2024)    PRAPARE - Transportation     Lack of Transportation (Medical): No     Lack of Transportation (Non-Medical): No   Physical Activity: Insufficiently Active (1/4/2024)    Exercise Vital Sign     Days of Exercise per Week: 3 days     Minutes of Exercise per Session: 10 min   Stress: No Stress Concern Present (1/4/2024)    [employer] - Occupational Stress Questionnaire     Feeling of Stress : Not at all   Social Connections: Unknown (1/4/2024)    Social Connection and Isolation Panel [NHANES]     Frequency of Communication with Friends and Family: More than three times a week     Frequency of Social Gatherings with Friends and Family: Twice a week     Active Member of Clubs or Organizations: Yes     Attends Club or Organization Meetings: More than 4 times per year     Marital Status: Never    Housing Stability: Low Risk  (1/4/2024)    Housing Stability Vital Sign     Unable to Pay for Housing in the Last Year: No     Number of Places Lived in the Last Year: 1     Unstable Housing in the Last Year: No             Pre-op Assessment    I have reviewed the Patient Summary Reports.      I have reviewed the Nursing Notes.    I have reviewed the Medications.     Review of Systems  Anesthesia Hx:  No problems with previous Anesthesia   History of prior surgery of interest to airway management or planning:          Denies Family Hx of Anesthesia complications.    Denies Personal Hx of Anesthesia complications.                    Social:  Non-Smoker       Hematology/Oncology:  Hematology Normal   Oncology Normal                                   EENT/Dental:   Maico-dallas, history of trach          Cardiovascular:                    Echo reviewed.                         Renal/:  Renal/ Normal                 Hepatic/GI:  Hepatic/GI Normal                 Musculoskeletal:  Musculoskeletal Normal                Neurological:  Neurology Normal                                      Endocrine:  Endocrine Normal            Psych:  Psychiatric Normal                    Physical Exam  General: Well nourished and Cooperative    Airway:  Mallampati: III   Mouth Opening: Small, but > 3cm  TM Distance: < 4 cm  Tongue: Normal  Neck ROM: Normal ROM    Dental:  Intact, Periodontal disease    Chest/Lungs:  Clear to auscultation, Normal Respiratory Rate    Heart:  Rate: Normal  Rhythm: Regular Rhythm  Sounds: Normal        Anesthesia Plan  Type of Anesthesia, risks & benefits discussed:    Anesthesia Type: Gen Supraglottic Airway, Gen Natural Airway  Intra-op Monitoring Plan: Standard ASA Monitors  Post Op Pain Control Plan: multimodal analgesia  Induction:  Inhalation and IV  Airway Plan: , Post-Induction  Informed Consent: Informed consent signed with the Patient representative and all parties understand the risks and agree with anesthesia plan.  All questions answered.   ASA Score: 3  Day of Surgery Review of History & Physical: H&P Update referred to the surgeon/provider.    Ready For Surgery From Anesthesia Perspective.     .

## 2024-04-17 NOTE — DISCHARGE INSTRUCTIONS
Tympanostomy Tube Post Op Instructions  AYESHA Barrett MD       DO NOT CALL [facility] ON CALL FOR POSTOPERATIVE PROBLEMS. CALL CLINIC [phone] OR THE  [phone] AND ASK FOR ENT ON CALL      What are the purpose of Tympanostomy tubes?  Tubes are typically placed for two reasons: persistent middle ear fluid that causes hearing loss and possible speech delay, and/or recurrent acute infections.  Tubes are used to drain the ears and provide a way for the ears to equalize the pressure between the outside and the middle ear (the space behind the eardrum). The tubes straddle the ear drum in order to keep a hole connecting the ear canal and middle ear. This decreases the chance of fluid building up in the middle ear and the risk of ear infections.      What should be expected following a Tympanostomy Tube Placement?    There may be drainage from your child's ears for up to 7 days after surgery. Initially this may have some blood tinged color and then can be any color. This is normal and will be treated with ear drops. However, if the drainage persists beyond 7 days, please call clinic for further instructions.   If your child had hearing loss before surgery, normal sounds may seem loud  due to the immediate improvement in hearing.  Your child may experience nausea, vomiting, and/or fatigue for a few hours after surgery, but this is unusual. Most children are recovered by the time they leave the hospital or surgery center. Your child should be able to progress to a normal diet when you return home.  Your child will be prescribed ear drops after surgery. These are meant to keep the tubes clear and help reduce inflammation.Use 4 drops in each ear twice daily for 7 days. Place 4 drops in the ear and then use the cartilage outside the ear canal to push the drops down the ear canal. Press the cartilage 4 times after 4 drops are placed.  There may be mild ear pain for the first few hours after surgery. This  can be treated with acetaminophen or ibuprofen and should resolve by the end of the day.  A post-operative appointment with a repeat hearing test will be scheduled for about three to four weeks after surgery. Following this the tubes will need to be followed  This will usually be recommended every 6 months, as long as the tubes remain in the ear (generally between 6 - 24 months).  NEW GUIDELINES STATE THAT DRY EAR PRECAUTIONS ARE NOT NECESSARY. Most children can swim and get their ears wet in the bath without any problems. However, if your child develops drainage the day after water exposure he/she may be the 1% that needs ear plugs. There are also other times when we recommend ear plugs:   Lake or ocean swimming  Dunking head under water in bath tub  Diving deeper than 6 feet in the pool      What are some reasons you should contact your doctor after surgery?  Nausea, vomiting and/or fatigue may occur for a few hours after surgery. However, if the nausea or vomiting lasts for more than 12 hours, you should contact your doctor.  Again, drainage of middle ear fluid may be seen for several days following surgery. This fluid can be clear, reddish, or bloody. However, if this drainage continues beyond seven days, your doctor should be contacted.  Some fussiness and/or a low grade fever (99 - 101F) may be noted after surgery. But if this fever lasts into the next day or reaches 102F, please contact your doctor.  Tubes will prevent ear infections from developing most of the time, but 25% of children (35% of children in day care) with tubes will get an occasional infection. Drainage from the ear will usually indicate an infection and needs to be evaluated. You may call our office for ear drainage if you prefer.   Your ear, nose and throat specialist should be contacted if two or more infections occur between scheduled office visits. In this case, further evaluation of the immune system or allergies may be done.

## 2024-04-17 NOTE — OP NOTE
Pre Op DX: C OME  Post Op Dx: Same    Procedure: 1. T tube insertion, Right   2. Use of the operating microscope   3. Exam under anesthesia, left ear    FINDINGS AT THE TIME OF SURGERY:                                           1.  Right ear: Severe retraction, T tube placed  2.  Left ear: Small <5% perforation, no retraction      PROCEDURE IN DETAIL:  After successful induction of general mask anesthesia.  The ears were examined with the microscope.  Alcohol and suction were used to clean the ears bilaterally.  Anterior myringotomy incision was made on the right and  a T tube was inserted. The left ear was found to have a small <5% perforation and no retraction. Ciprodex was applied bilaterally.  The patient was awakened and transported to the Recovery Room in good condition.  There were no complications.       Anesthesia: General    EBL: 0    To RR in good condition           04/17/2024    Surgeon CYNDEE Barrett MD

## 2024-04-17 NOTE — ANESTHESIA POSTPROCEDURE EVALUATION
Anesthesia Post Evaluation    Patient: Dixie Rogers    Procedure(s) Performed: Procedure(s) (LRB):  MYRINGOTOMY, WITH TYMPANOSTOMY TUBE INSERTION (Right)    Final Anesthesia Type: general      Patient location during evaluation: PACU  Patient participation: Yes- Able to Participate  Level of consciousness: awake and alert  Post-procedure vital signs: reviewed and stable  Pain management: adequate  Airway patency: patent    PONV status at discharge: No PONV  Anesthetic complications: no      Cardiovascular status: blood pressure returned to baseline and hemodynamically stable  Respiratory status: unassisted and spontaneous ventilation  Hydration status: euvolemic  Follow-up not needed.              Vitals Value Taken Time   BP 98/52 04/17/24 0901   Temp 36.8 °C (98.2 °F) 04/17/24 0915   Pulse 76 04/17/24 0914   Resp 18 04/17/24 0915   SpO2 100 % 04/17/24 0914   Vitals shown include unfiled device data.      No case tracking events are documented in the log.      Pain/Geronimo Score: Geronimo Score: 10 (4/17/2024  9:00 AM)

## 2024-04-23 ENCOUNTER — TELEPHONE (OUTPATIENT)
Dept: OTOLARYNGOLOGY | Facility: CLINIC | Age: 19
End: 2024-04-23
Payer: MEDICAID

## 2024-04-23 NOTE — TELEPHONE ENCOUNTER
----- Message from Julieth Sanchez sent at 4/23/2024 10:01 AM CDT -----  Regarding: Post Op Appt  Contact: [phone]  Dixie Rogers calling regarding Patient Advice (message) for # pt mom is calling to schedule Post Op 3 week f/u with provider. pt had surgery with provider on 4/17 please call Mom to advise and schedule pt

## 2024-05-14 ENCOUNTER — OFFICE VISIT (OUTPATIENT)
Dept: OTOLARYNGOLOGY | Facility: CLINIC | Age: 19
End: 2024-05-14
Attending: SURGERY
Payer: MEDICAID

## 2024-05-14 VITALS — WEIGHT: 67.69 LBS | BODY MASS INDEX: 18.44 KG/M2

## 2024-05-14 DIAGNOSIS — H73.893 RETRACTION OF TYMPANIC MEMBRANE OF BOTH EARS: ICD-10-CM

## 2024-05-14 DIAGNOSIS — J38.00 VOCAL CORD PARALYSIS: ICD-10-CM

## 2024-05-14 DIAGNOSIS — H02.403 PTOSIS OF EYELID, BILATERAL: ICD-10-CM

## 2024-05-14 DIAGNOSIS — H72.92 PERFORATED TYMPANIC MEMBRANE, LEFT: ICD-10-CM

## 2024-05-14 DIAGNOSIS — Q35.9 CLEFT PALATE: ICD-10-CM

## 2024-05-14 DIAGNOSIS — Q87.0 PIERRE ROBIN SYNDROME: Primary | ICD-10-CM

## 2024-05-14 PROCEDURE — 99212 OFFICE O/P EST SF 10 MIN: CPT | Mod: PBBFAC | Performed by: OTOLARYNGOLOGY

## 2024-05-14 PROCEDURE — 1159F MED LIST DOCD IN RCRD: CPT | Mod: CPTII,,, | Performed by: OTOLARYNGOLOGY

## 2024-05-14 PROCEDURE — 99024 POSTOP FOLLOW-UP VISIT: CPT | Mod: ,,, | Performed by: OTOLARYNGOLOGY

## 2024-05-14 PROCEDURE — 99999 PR PBB SHADOW E&M-EST. PATIENT-LVL II: CPT | Mod: PBBFAC,,, | Performed by: OTOLARYNGOLOGY

## 2024-05-14 NOTE — PROGRESS NOTES
Subjective:       Patient ID: Dixie Rogers is a 19 y.o. female.    Chief Complaint: s/p R PE tube 4/17/2024    HPI  As above. Last seen 3/13/23 w severe retraction AD.    Has very complex PMH.See ROS. Known HL AS. Maico Dickey w CP - 3 CP surg.    Has  a known perforation of left ear and COM w retraction  AD. The abnormality was first noted several  years ago. There is prior history of PE Tubes. There is no prior Hx of ear trauma, of a blow to the ear, of placing a sharp object in the ear and of placing a foreign body in the ear. The size of the perforation is medium (26 - 50%). Associated signs and symptoms include hearing loss and drainage on/off. The patient has not had a prior repair on either ear.       Seems well.     Has HL AU . AS >> AD.  Needs aids. Not done yet.          Review of Systems   Constitutional: Negative.         [place]   HENT:  Positive for hearing loss.         Perf AU  MT x 2 AD/x1 AS - out AU   SNHL AS  CP - 3 surg  SD w severe retrognathia and diff airway   Eyes:  Negative for visual disturbance.   Respiratory:  Negative for wheezing and stridor.         Trach - removed   Cardiovascular: Negative.         PDA repair   Gastrointestinal:  Negative for nausea and vomiting.        G tube - out    Genitourinary:  Negative for enuresis.        No UTI's   Musculoskeletal:  Negative for arthralgias and myalgias.   Skin: Negative.    Neurological:  Negative for dizziness, seizures and weakness.        No focal neurological signs   Hematological:  Negative for adenopathy. Does not bruise/bleed easily.        Negative for anemia   Psychiatric/Behavioral:  Negative for behavioral problems. The patient is not hyperactive.        Objective:      Physical Exam  Constitutional:       Appearance: She is ill-appearing (very, very small for age).      Comments: Poor speech; nasal   HENT:      Head: Normocephalic.      Right Ear: No drainage ( ). No middle ear effusion. There is no impacted cerumen. A PE tube (t tube  patent) is present. Tympanic membrane is not perforated (closed), erythematous or retracted.      Left Ear: External ear normal. No drainage.  No middle ear effusion. There is no impacted cerumen. Tympanic membrane is scarred and perforated (1-% ant inf). Tympanic membrane is not erythematous or retracted.      Ears:        Nose: Nose normal. No nasal deformity.      Mouth/Throat:      Mouth: No oral lesions (sm chin; good ROM of TMJ).      Dentition: Abnormal dentition. No dental caries ( numerous fillings).      Tonsils: 2+ on the right. 2+ on the left.     Eyes:      Pupils: Pupils are equal, round, and reactive to light.   Neck:      Trachea: Trachea normal.   Cardiovascular:      Rate and Rhythm: Normal rate and regular rhythm.   Pulmonary:      Effort: Pulmonary effort is normal. No respiratory distress.   Musculoskeletal:         General: Normal range of motion.      Cervical back: Normal range of motion.   Skin:     General: Skin is warm.      Findings: No rash.   Neurological:      Mental Status: She is alert.      Cranial Nerves: No cranial nerve deficit.   Psychiatric:         Speech: Speech is delayed.         Behavior: Behavior normal.      Comments: Mild/mod DD           Cerumen removal: Ears cleared under microscopic vision with curette, forceps and suction as necessary. Child appropriately restrained by parent or/and papoose board.   Assessment:       1. Maico Noble syndrome    2. Cleft palate    3. Perforated tympanic membrane, left    4. Retraction of tympanic membrane of both ears - severe R resolved w T tube ; resolved AS    5. Vocal cord paralysis - L    6. Ptosis of eyelid, bilateral        Plan:       1. RTC 3 mos

## 2024-08-13 ENCOUNTER — OFFICE VISIT (OUTPATIENT)
Dept: OTOLARYNGOLOGY | Facility: CLINIC | Age: 19
End: 2024-08-13
Payer: MEDICAID

## 2024-08-13 VITALS — BODY MASS INDEX: 18.32 KG/M2 | WEIGHT: 67.25 LBS

## 2024-08-13 DIAGNOSIS — H02.403 PTOSIS OF EYELID, BILATERAL: ICD-10-CM

## 2024-08-13 DIAGNOSIS — H72.92 PERFORATED TYMPANIC MEMBRANE, LEFT: ICD-10-CM

## 2024-08-13 DIAGNOSIS — Q35.9 CLEFT PALATE: ICD-10-CM

## 2024-08-13 DIAGNOSIS — J38.00 VOCAL CORD PARALYSIS: ICD-10-CM

## 2024-08-13 DIAGNOSIS — H61.23 BILATERAL IMPACTED CERUMEN: ICD-10-CM

## 2024-08-13 DIAGNOSIS — Q87.0 PIERRE ROBIN SYNDROME: Primary | ICD-10-CM

## 2024-08-13 DIAGNOSIS — H73.893 RETRACTION OF TYMPANIC MEMBRANE OF BOTH EARS: ICD-10-CM

## 2024-08-13 PROCEDURE — 69210 REMOVE IMPACTED EAR WAX UNI: CPT | Mod: S$PBB,,, | Performed by: OTOLARYNGOLOGY

## 2024-08-13 PROCEDURE — 3008F BODY MASS INDEX DOCD: CPT | Mod: CPTII,,, | Performed by: OTOLARYNGOLOGY

## 2024-08-13 PROCEDURE — 99214 OFFICE O/P EST MOD 30 MIN: CPT | Mod: 25,S$PBB,, | Performed by: OTOLARYNGOLOGY

## 2024-08-13 PROCEDURE — 69210 REMOVE IMPACTED EAR WAX UNI: CPT | Mod: 50,PBBFAC | Performed by: OTOLARYNGOLOGY

## 2024-08-13 PROCEDURE — 99999 PR PBB SHADOW E&M-EST. PATIENT-LVL II: CPT | Mod: PBBFAC,,, | Performed by: OTOLARYNGOLOGY

## 2024-08-13 PROCEDURE — 99212 OFFICE O/P EST SF 10 MIN: CPT | Mod: PBBFAC,25 | Performed by: OTOLARYNGOLOGY

## 2024-08-13 PROCEDURE — 1159F MED LIST DOCD IN RCRD: CPT | Mod: CPTII,,, | Performed by: OTOLARYNGOLOGY

## 2024-08-13 NOTE — PROGRESS NOTES
Subjective:       Patient ID: Dixie Rogers is a 19 y.o. female.    Chief Complaint: Retraction of TM AU, Cleft Palate, and Maico Noble syndrome    HPI  As above. Last seen 5/14/24  .    Has very complex PMH.See ROS. Known HL AS. Maico Noble w CP - 3 CP surg.    Has  a known perforation of left ear and COM w retraction  AD. The abnormality was first noted several  years ago. There is prior history of PE Tubes. There is no prior Hx of ear trauma, of a blow to the ear, of placing a sharp object in the ear and of placing a foreign body in the ear. The size of the perforation is medium (26 - 50%). Associated signs and symptoms include hearing loss and drainage on/off. The patient has not had a prior repair on either ear.       Seems well.     Has HL AU . AS >> AD.  Needs aids. Not done yet.          Review of Systems   Constitutional: Negative.         [place]   HENT:  Positive for hearing loss.         Perf AU  MT x 2 AD/x1 AS - out AU   SNHL AS  CP - 3 surg  [place] w severe retrognathia and diff airway   Eyes:  Negative for visual disturbance.   Respiratory:  Negative for wheezing and stridor.         Trach - removed   Cardiovascular: Negative.         PDA repair   Gastrointestinal:  Negative for nausea and vomiting.        G tube - out    Genitourinary:  Negative for enuresis.        No UTI's   Musculoskeletal:  Negative for arthralgias and myalgias.   Skin: Negative.    Neurological:  Negative for dizziness, seizures and weakness.        No focal neurological signs   Hematological:  Negative for adenopathy. Does not bruise/bleed easily.        Negative for anemia   Psychiatric/Behavioral:  Negative for behavioral problems. The patient is not hyperactive.        Objective:      Physical Exam  Constitutional:       Appearance: She is ill-appearing (very, very small for age).      Comments: Poor speech; nasal   HENT:      Head: Normocephalic.      Right Ear: No drainage ( ). No middle ear effusion. There is impacted cerumen. A PE  tube (t tube patent) is present. Tympanic membrane is scarred and perforated (closedvery sm perf below T tube shaft). Tympanic membrane is not erythematous or retracted.      Left Ear: External ear normal. No drainage.  No middle ear effusion. There is impacted cerumen. Tympanic membrane is scarred and perforated (1-% ant inf). Tympanic membrane is not erythematous or retracted.      Ears:        Nose: Nose normal. No nasal deformity.      Mouth/Throat:      Mouth: No oral lesions (sm chin; good ROM of TMJ).      Dentition: Abnormal dentition (teeth in  poor condition). Dental caries ( numerous fillings) present.      Tonsils: 2+ on the right. 2+ on the left.     Eyes:      Pupils: Pupils are equal, round, and reactive to light.   Neck:      Trachea: Trachea normal.   Cardiovascular:      Rate and Rhythm: Normal rate and regular rhythm.   Pulmonary:      Effort: Pulmonary effort is normal. No respiratory distress.   Musculoskeletal:         General: Normal range of motion.      Cervical back: Normal range of motion.   Skin:     General: Skin is warm.      Findings: No rash.   Neurological:      Mental Status: She is alert.      Cranial Nerves: No cranial nerve deficit.   Psychiatric:         Speech: Speech is delayed.         Behavior: Behavior normal.      Comments: Mild/mod DD           Cerumen removal: Ears cleared under microscopic vision with curette, forceps and suction as necessary. Child appropriately restrained by parent or/and papoose board.   Assessment:       1. Maico Noble syndrome    2. Cleft palate    3. Perforated tympanic membrane, left    4. Retraction of tympanic membrane of both ears - severe R resolved w T tube ; resolved AS    5. Vocal cord paralysis - L    6. Ptosis of eyelid, bilateral    7. Bilateral impacted cerumen        Plan:       1. RTC 4 mos   2. Re ck audio then   3. Needs dental care

## 2024-08-15 ENCOUNTER — TELEPHONE (OUTPATIENT)
Dept: OTOLARYNGOLOGY | Facility: CLINIC | Age: 19
End: 2024-08-15
Payer: MEDICAID

## 2024-08-15 NOTE — TELEPHONE ENCOUNTER
----- Message from Damaris Woodson MA sent at 8/14/2024  4:01 PM CDT -----  Regarding: FW: reschedule appt  Contact: [phone]    ----- Message -----  From: Daina Blevins  Sent: 8/14/2024   1:54 PM CDT  To: [facility] Staff  Subject: reschedule appt                                  Pt mother Akanksha is calling to speak with someone in provider office in regards to having the pt appt on 11/19 changed to 11/18 Akanksha stated she prefers a Monday appt Akanksha is asking for a return call please call pt at   [phone]   ----- Message from Shellie Hull DO sent at 12/15/2022  4:17 PM CST -----  Thyroid peroxidase antibody elevation is indicative of chronic autoimmune thyroiditis. TSH is slightly elevated but I believe that she had been out of her levothyroxine prior to seeing me and should simply resume the dose that was refilled.    Metabolic panel was within normal limits.    Cholesterol is slightly elevated but there is no need for cholesterol lowering medication at this time.    CXR was normal. There were no obvious masses.

## 2024-11-19 ENCOUNTER — CLINICAL SUPPORT (OUTPATIENT)
Dept: AUDIOLOGY | Facility: CLINIC | Age: 19
End: 2024-11-19
Payer: MEDICAID

## 2024-11-19 ENCOUNTER — OFFICE VISIT (OUTPATIENT)
Dept: OTOLARYNGOLOGY | Facility: CLINIC | Age: 19
End: 2024-11-19
Payer: MEDICAID

## 2024-11-19 VITALS — WEIGHT: 69.88 LBS | BODY MASS INDEX: 19.04 KG/M2

## 2024-11-19 DIAGNOSIS — H90.A32 MIXED CONDUCTIVE AND SENSORINEURAL HEARING LOSS OF LEFT EAR WITH RESTRICTED HEARING OF RIGHT EAR: Primary | ICD-10-CM

## 2024-11-19 DIAGNOSIS — Q35.9 CLEFT PALATE: ICD-10-CM

## 2024-11-19 DIAGNOSIS — H72.92 PERFORATED TYMPANIC MEMBRANE, LEFT: ICD-10-CM

## 2024-11-19 DIAGNOSIS — H73.893 RETRACTION OF TYMPANIC MEMBRANE OF BOTH EARS: ICD-10-CM

## 2024-11-19 DIAGNOSIS — H90.6 MIXED HEARING LOSS, BILATERAL: ICD-10-CM

## 2024-11-19 DIAGNOSIS — J38.00 VOCAL CORD PARALYSIS: ICD-10-CM

## 2024-11-19 DIAGNOSIS — H02.403 PTOSIS OF EYELID, BILATERAL: ICD-10-CM

## 2024-11-19 DIAGNOSIS — H61.23 BILATERAL IMPACTED CERUMEN: ICD-10-CM

## 2024-11-19 DIAGNOSIS — Q87.0 PIERRE ROBIN SYNDROME: Primary | ICD-10-CM

## 2024-11-19 PROCEDURE — 92567 TYMPANOMETRY: CPT | Mod: PBBFAC | Performed by: AUDIOLOGIST

## 2024-11-19 PROCEDURE — 92582 CONDITIONING PLAY AUDIOMETRY: CPT | Mod: PBBFAC | Performed by: AUDIOLOGIST

## 2024-11-19 PROCEDURE — 92555 SPEECH THRESHOLD AUDIOMETRY: CPT | Mod: PBBFAC | Performed by: AUDIOLOGIST

## 2024-11-19 PROCEDURE — 99212 OFFICE O/P EST SF 10 MIN: CPT | Mod: PBBFAC | Performed by: OTOLARYNGOLOGY

## 2024-11-19 PROCEDURE — 99999PBSHW PR PBB SHADOW TECHNICAL ONLY FILED TO HB: Mod: PBBFAC,,,

## 2024-11-19 PROCEDURE — 99999 PR PBB SHADOW E&M-EST. PATIENT-LVL II: CPT | Mod: PBBFAC,,, | Performed by: OTOLARYNGOLOGY

## 2024-11-19 PROCEDURE — 69210 REMOVE IMPACTED EAR WAX UNI: CPT | Mod: 50,PBBFAC | Performed by: OTOLARYNGOLOGY

## 2024-11-19 NOTE — PROGRESS NOTES
Audiologic Evaluation 11/19/2024:       Dixie Rogers, a 19 y.o. female, was seen today in the clinic for an audiologic evaluation.  Dixie Rogers has a history of bilateral hearing loss that is worse in the left ear. Dixie's mother reported that Akanksha has hearing aids from a clinic in [place], but does not wear her hearing aids regularly. Dixie denied ear pain. Dixie has Maico Noble syndrome and a speech delay. Dixie has a tympanic membrane perforation of the left ear and a PE tube and TM perforation in the right ear.    Tympanometry revealed Type B tympanogram with large ear canal volume in the right ear and Type B tympanogram with large ear canal volume in the left ear. Conditioned play audiometry with two audiologists results revealed mild hearing loss that is likely conductive in nature in the right ear and moderate to severe mixed hearing loss in the left ear.  Speech reception thresholds were noted at 40 dB in the right ear and 60 dB in the left ear.      Recommendations:  Otologic evaluation  Follow-up with hearing aid audiologist  Annual audiogram  Hearing protection when in noise

## 2024-11-19 NOTE — PROGRESS NOTES
Subjective:       Patient ID: Dixie Rogers is a 19 y.o. female.    Chief Complaint: Follow-up    HPI  As above. Last seen 8/13/24  .    Has very complex PMH.See ROS. Known HL AS. Maico Dickey w CP - 3 CP surg.    Has  a known perforation of left ear and COM w retraction  AD. The abnormality was first noted several  years ago. There is prior history of PE Tubes. There is no prior Hx of ear trauma, of a blow to the ear, of placing a sharp object in the ear and of placing a foreign body in the ear. The size of the perforation is medium (26 - 50%). Associated signs and symptoms include hearing loss and drainage on/off. The patient has not had a prior repair on either ear.       Seems well.     Has HL AU . AS >> AD.  Needs aids. Not done yet.          Review of Systems   Constitutional: Negative.         [place]   HENT:  Positive for hearing loss.         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   Respiratory:  Negative for wheezing and stridor.         Trach - removed   Cardiovascular: Negative.         PDA repair   Gastrointestinal:  Negative for nausea and vomiting.        G tube - out    Genitourinary:  Negative for enuresis.        No UTI's   Musculoskeletal:  Negative for arthralgias and myalgias.   Skin: Negative.    Neurological:  Negative for dizziness, seizures and weakness.        No focal neurological signs   Hematological:  Negative for adenopathy. Does not bruise/bleed easily.        Negative for anemia   Psychiatric/Behavioral:  Negative for behavioral problems. The patient is not hyperactive.        Objective:      Physical Exam  Constitutional:       Appearance: She is ill-appearing (very, very small for age).      Comments: Poor speech; nasal   HENT:      Head: Normocephalic.      Right Ear: No drainage ( ). No middle ear effusion. There is impacted cerumen. A PE tube (t tube patent) is present. Tympanic membrane  is scarred and perforated (closedvery sm perf below T tube shaft). Tympanic membrane is not erythematous or retracted.      Left Ear: External ear normal. No drainage.  No middle ear effusion. There is impacted cerumen. Tympanic membrane is scarred and perforated (1-% ant inf). Tympanic membrane is not erythematous or retracted.      Ears:        Nose: Nose normal. No nasal deformity.      Mouth/Throat:      Mouth: No oral lesions (sm chin; good ROM of TMJ).      Dentition: Abnormal dentition (teeth in  poor condition). Dental caries ( numerous fillings) present.      Tonsils: 2+ on the right. 2+ on the left.     Eyes:      Pupils: Pupils are equal, round, and reactive to light.   Neck:      Trachea: Trachea normal.   Cardiovascular:      Rate and Rhythm: Normal rate and regular rhythm.   Pulmonary:      Effort: Pulmonary effort is normal. No respiratory distress.   Musculoskeletal:         General: Normal range of motion.      Cervical back: Normal range of motion.   Skin:     General: Skin is warm.      Findings: No rash.   Neurological:      Mental Status: She is alert.      Cranial Nerves: No cranial nerve deficit.   Psychiatric:         Speech: Speech is delayed.         Behavior: Behavior normal.      Comments: Mild/mod DD           Cerumen removal: Ears cleared under microscopic vision with curette, forceps and suction as necessary. Child appropriately restrained by parent or/and papoose board.             Assessment:       1. Maico Noble syndrome    2. Cleft palate    3. Perforated tympanic membrane, left    4. Retraction of tympanic membrane of both ears - severe R resolved w T tube ; resolved AS    5. Vocal cord paralysis - L    6. Ptosis of eyelid, bilateral    7. Bilateral impacted cerumen    8. Mixed hearing loss, bilateral        Plan:       1. RTC 4 mos   2. Aids AU   3. Needs dental care

## (undated) DEVICE — BLADE BEVELED GUARISCO

## (undated) DEVICE — PACK MYRINGOTOMY CUSTOM